# Patient Record
Sex: FEMALE | Race: WHITE | Employment: OTHER | URBAN - METROPOLITAN AREA
[De-identification: names, ages, dates, MRNs, and addresses within clinical notes are randomized per-mention and may not be internally consistent; named-entity substitution may affect disease eponyms.]

---

## 2021-08-24 ENCOUNTER — TELEPHONE (OUTPATIENT)
Dept: CARDIOLOGY CLINIC | Age: 86
End: 2021-08-24

## 2021-08-24 NOTE — TELEPHONE ENCOUNTER
Pt was referred by a friend to see [address]. She is having swelling in feet. No Chest pain or sob.  Please call to schedule

## 2021-08-25 NOTE — TELEPHONE ENCOUNTER
tt pt Dr. Kiki Weiner first np appt is 10/28/21 pt said she will call her pcp bc of swelling. She will call us back to sched if pcp can not help her.

## 2021-10-28 ENCOUNTER — OFFICE VISIT (OUTPATIENT)
Dept: CARDIOLOGY CLINIC | Age: 86
End: 2021-10-28
Payer: COMMERCIAL

## 2021-10-28 VITALS
HEART RATE: 63 BPM | SYSTOLIC BLOOD PRESSURE: 148 MMHG | WEIGHT: 116 LBS | OXYGEN SATURATION: 97 % | BODY MASS INDEX: 21.35 KG/M2 | HEIGHT: 62 IN | DIASTOLIC BLOOD PRESSURE: 64 MMHG

## 2021-10-28 DIAGNOSIS — R60.0 LOCALIZED EDEMA: ICD-10-CM

## 2021-10-28 DIAGNOSIS — R01.1 SYSTOLIC MURMUR: ICD-10-CM

## 2021-10-28 DIAGNOSIS — I10 PRIMARY HYPERTENSION: Primary | ICD-10-CM

## 2021-10-28 DIAGNOSIS — E78.49 OTHER HYPERLIPIDEMIA: ICD-10-CM

## 2021-10-28 PROCEDURE — 93000 ELECTROCARDIOGRAM COMPLETE: CPT | Performed by: INTERNAL MEDICINE

## 2021-10-28 RX ORDER — METOPROLOL SUCCINATE 25 MG/1
25 TABLET, EXTENDED RELEASE ORAL DAILY
COMMUNITY
Start: 2021-04-13

## 2021-10-28 RX ORDER — FAMOTIDINE 20 MG/1
TABLET, FILM COATED ORAL
COMMUNITY
Start: 2021-10-20

## 2021-10-28 RX ORDER — TRAZODONE HYDROCHLORIDE 50 MG/1
TABLET ORAL
COMMUNITY
Start: 2021-08-27

## 2021-10-28 RX ORDER — ASPIRIN 81 MG/1
81 TABLET ORAL DAILY
COMMUNITY

## 2021-10-28 RX ORDER — LOVASTATIN 20 MG/1
20 TABLET ORAL NIGHTLY
COMMUNITY
Start: 2021-04-13

## 2021-10-28 RX ORDER — LEVOTHYROXINE SODIUM 0.05 MG/1
50 TABLET ORAL DAILY
COMMUNITY
Start: 2021-04-13

## 2021-10-28 NOTE — PROGRESS NOTES
[facility]   Cardiac Evaluation      Patient: Yajaira Woodard  YOB: 1932         Chief Complaint   Patient presents with    Edema    Establish Cardiologist        Referring provider: Pia Desir    History of Present Illness:   Ms Lenora Ramos is seen today as a new patient for LE edema. She states her left lower leg has been swelling for about 6 months. She has hypertension and hyperlipidemia. Matilde Godinez is also treated for hypothyroidism and GERD. She denies any history of early heart disease. She does not smoke. Matilde Godinez is here with her son. She states she takes care of her home and gardening and walks for exercise. She does not sleep well, states she never has. Matilde Godinez denies any chest pain, palpitations, MORAN, or dizziness. She is wearing compression hose for LE edema. Past Medical History:   has a past medical history of Anxiety, Hyperlipidemia, Osteoporosis, and Thyroid disease. Surgical History:   has a past surgical history that includes Breast surgery (3/2000). Current Outpatient Medications   Medication Sig Dispense Refill    levothyroxine (SYNTHROID) 50 MCG tablet Take 50 mcg by mouth daily      lovastatin (MEVACOR) 20 MG tablet Take 20 mg by mouth nightly      metoprolol succinate (TOPROL XL) 25 MG extended release tablet Take 25 mg by mouth daily      famotidine (PEPCID) 20 MG tablet       traZODone (DESYREL) 50 MG tablet       aspirin EC 81 MG EC tablet Take 81 mg by mouth daily      fish oil-omega-3 fatty acids 1000 MG capsule Take 2 g by mouth daily.  Calcium Carbonate-Vitamin D (CALCIUM + D PO) Take  by mouth. No current facility-administered medications for this visit. Allergies:  Tetanus toxoids and Tolectin [tolmetin sodium]     Social History:  Social History     Socioeconomic History    Marital status:       Spouse name: Not on file    Number of children: Not on file    Years of education: Not on file    Highest education level: Not on file   Occupational History    Not on file   Tobacco Use    Smoking status: Never Smoker    Smokeless tobacco: Never Used   Substance and Sexual Activity    Alcohol use: Not Currently    Drug use: Not on file    Sexual activity: Not on file   Other Topics Concern    Not on file   Social History Narrative    Not on file     Social Determinants of Health     Financial Resource Strain:     Difficulty of Paying Living Expenses:    Food Insecurity:     Worried About Running Out of Food in the Last Year:     [address] in the Last Year:    Transportation Needs:     Lack of Transportation (Medical):  Lack of Transportation (Non-Medical):    Physical Activity:     Days of Exercise per Week:     Minutes of Exercise per Session:    Stress:     Feeling of Stress :    Social Connections:     Frequency of Communication with Friends and Family:     Frequency of Social Gatherings with Friends and Family:     Attends [employer] Services:     Active Member of Clubs or Organizations:     Attends Club or Organization Meetings:     Marital Status:    Intimate Partner Violence:     Fear of Current or Ex-Partner:     Emotionally Abused:     Physically Abused:     Sexually Abused:        Family History:   Negative for heart disease    Review of Systems:   · Constitutional: there has been no unanticipated weight loss. No change in energy or activity level   · Eyes: No visual changes   · ENT: No Headaches, hearing loss or vertigo. No mouth sores or sore throat. · Cardiovascular: Reviewed in HPI  · Respiratory: No cough or wheezing, no sputum production. · Gastrointestinal: No abdominal pain, appetite loss, blood in stools. No change in bowel or bladder habits. · Genitourinary: No nocturia, dysuria, trouble voiding  · Musculoskeletal:  No gait disturbance, weakness or joint complaints. · Integumentary: No rash or pruritis.   · Neurological: No headache, change in muscle strength, numbness or tingling. No change in gait, balance, coordination, mood, affect, memory, mentation, behavior. · Psychiatric: No anxiety or depression  · Endocrine: No malaise or fever  · Hematologic/Lymphatic: No abnormal bruising or bleeding, blood clots or swollen lymph nodes. · Allergic/Immunologic: No nasal congestion or hives. Physical Examination:    Vitals:    10/28/21 1308 10/28/21 1316   BP: (!) 142/64 (!) 148/64   Site: Left Upper Arm Left Upper Arm   Position: Sitting Sitting   Cuff Size: Medium Adult Medium Adult   Pulse:  63   SpO2: 97%    Weight: 116 lb (52.6 kg)    Height: 5' 2\" (1.575 m)      Body mass index is 21.22 kg/m². Wt Readings from Last 3 Encounters:   10/28/21 116 lb (52.6 kg)   01/02/13 130 lb (59 kg)   08/03/12 130 lb (59 kg)      BP Readings from Last 3 Encounters:   10/28/21 (!) 148/64   01/02/13 140/66   08/03/12 122/78        Physical Examination:    · CONSTITUTIONAL: Well developed, well nourished  · EYES: PERRLA. No xanthelasma, sclera non icteric  · EARS,NOSE,MOUTH,THROAT:  Mucous membranes moist, normal hearing  · NECK: Supple, JVP normal, thyroid not enlarged. Carotids 2+ without bruits  · RESPIRATORY: Normal effort, no rales or rhonchi  · CARDIOVASCULAR: Normal PMI, regular rate and rhythm, 3/6 QUOC mid peaking at right upper sternal border, no rub or gallop. Radial pulses present and equal  · Varicose veins,   · CHEST: No scar or masses  · ABDOMEN: Normal bowel sounds. No masses or tenderness. No bruit  · MUSCULOSKELETAL: No clubbing or cyanosis. Moves all extremities well. Normal gait  · SKIN:  Warm and dry. No rashes  · NEUROLOGIC: Cranial nerves intact. Alert and oriented  · PSYCHIATRIC: Calm affect. Appears to have normal judgement and insight        Assessment/Plan  1. Primary hypertension - well controlled on present meds.     2. Localized edema - wearing compression hose; Her liver and kidneys are normal. She has never had any heart disase or pulmonary disease other than htn. I will order an echo to check for diastolic problems or pulmonary htn. 3. Other hyperlipidemia - Mevacor 20mg daily, labs 6/11/21: ; TRIG 119; HDL 45; LDL 97       PLAN:  Will obtain an echo. Encouraged to continue wearing compression hose. Scribe's attestation: This note was scribed in the presence of Dr Swapna Ford MD by Beny He RN. The scribe's documentation has been prepared under my direction and personally reviewed by me in its entirety. I confirm that the note above accurately reflects all work, treatment, procedures, and medical decision making performed by me. Thank you for allowing to me to participate in the care of [facility] [place].

## 2021-11-03 NOTE — ADDENDUM NOTE
[facility]: 11/3/2021 01:17 PM     Modules accepted: Level of Service

## 2021-11-12 ENCOUNTER — HOSPITAL ENCOUNTER (OUTPATIENT)
Dept: CARDIOLOGY | Age: 86
Discharge: HOME | End: 2021-11-12
Payer: COMMERCIAL

## 2021-11-12 DIAGNOSIS — I10 PRIMARY HYPERTENSION: ICD-10-CM

## 2021-11-12 DIAGNOSIS — R60.0 LOCALIZED EDEMA: ICD-10-CM

## 2021-11-12 DIAGNOSIS — R01.1 SYSTOLIC MURMUR: ICD-10-CM

## 2021-11-12 LAB
LV EF: 50 %
LVEF MODALITY: NORMAL

## 2021-11-12 PROCEDURE — 93306 TTE W/DOPPLER COMPLETE: CPT
